# Patient Record
Sex: FEMALE | Race: WHITE | NOT HISPANIC OR LATINO | URBAN - METROPOLITAN AREA
[De-identification: names, ages, dates, MRNs, and addresses within clinical notes are randomized per-mention and may not be internally consistent; named-entity substitution may affect disease eponyms.]

---

## 2017-04-05 ENCOUNTER — EMERGENCY (EMERGENCY)
Facility: HOSPITAL | Age: 62
LOS: 1 days | Discharge: HOME | End: 2017-04-05
Attending: EMERGENCY MEDICINE | Admitting: EMERGENCY MEDICINE
Payer: COMMERCIAL

## 2017-04-05 VITALS
TEMPERATURE: 98 F | DIASTOLIC BLOOD PRESSURE: 82 MMHG | RESPIRATION RATE: 16 BRPM | OXYGEN SATURATION: 100 % | SYSTOLIC BLOOD PRESSURE: 150 MMHG | HEART RATE: 77 BPM

## 2017-04-05 VITALS
HEART RATE: 80 BPM | OXYGEN SATURATION: 100 % | DIASTOLIC BLOOD PRESSURE: 80 MMHG | RESPIRATION RATE: 16 BRPM | SYSTOLIC BLOOD PRESSURE: 145 MMHG | TEMPERATURE: 98 F

## 2017-04-05 DIAGNOSIS — E78.5 HYPERLIPIDEMIA, UNSPECIFIED: ICD-10-CM

## 2017-04-05 DIAGNOSIS — S01.01XA LACERATION WITHOUT FOREIGN BODY OF SCALP, INITIAL ENCOUNTER: ICD-10-CM

## 2017-04-05 DIAGNOSIS — S09.90XA UNSPECIFIED INJURY OF HEAD, INITIAL ENCOUNTER: ICD-10-CM
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
## 2017-04-05 DIAGNOSIS — Z98.890 OTHER SPECIFIED POSTPROCEDURAL STATES: ICD-10-CM

## 2017-04-05 DIAGNOSIS — Z90.49 ACQUIRED ABSENCE OF OTHER SPECIFIED PARTS OF DIGESTIVE TRACT: ICD-10-CM
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
## 2017-04-05 PROCEDURE — 70450 CT HEAD/BRAIN W/O DYE: CPT | Mod: 26

## 2017-04-05 PROCEDURE — 99284 EMERGENCY DEPT VISIT MOD MDM: CPT

## 2017-04-05 PROCEDURE — 70450 CT HEAD/BRAIN W/O DYE: CPT

## 2017-04-05 PROCEDURE — 99284 EMERGENCY DEPT VISIT MOD MDM: CPT | Mod: 25

## 2017-04-05 NOTE — ED PROVIDER NOTE - CARE PLAN
Principal Discharge DX:	Injury of head, initial encounter  Instructions for follow-up, activity and diet:	Rest, increase activity as tolerated.  Rest, eat lightly, Avoid alcohol or sedatives.  follow up with your physician in the next week. See attached ct head results.  Take motrin or tylenol for pain as needed.  Have your staples removed in one week.

## 2017-04-05 NOTE — ED PROVIDER NOTE - ATTENDING CONTRIBUTION TO CARE
Patient s/p mechanical fall with posterior head trauma.  No LOC, no vision changes, no nausea or vomiting.  Able to stand and ambulate after fall.  No blood thinners.  Seen at urgent care facility where laceration was stapled.  Sent to ED for CT head.  On exam VS WNL, GCS 15, neurologically intact.  Posterior scalp laceration with staples.  [place] head CT rule negative - discussed this with patient who is still requesting CT head.

## 2017-04-05 NOTE — ED PROVIDER NOTE - OBJECTIVE STATEMENT
60 yo female in room 5 presents to the ER for evaluation of head injury. Pt states "When I got out of the shower I slipped and fell and hit the edge of the steps and cut my head open and went to Urgent care and I got staples. The urgent care sent me here to the ER  for a head CT". Denies LOC. Pt reports dizziness immediately after incident that has resolved. 62 yo female in [employer] room 5 presents to the ER for evaluation of head injury. Pt states "When I got out of the shower I slipped and fell and hit the edge of the steps and cut my head open and went to Urgent care and I got staples. The urgent care sent me here to the ER  for a head CT". Denies LOC. Pt reports dizziness immediately after incident that has resolved.

## 2017-04-05 NOTE — ED ADULT NURSE NOTE - OBJECTIVE STATEMENT
60yo female pt AxOx3 ambulatory to ED sent by urgent care for CT. Pt tripped in shower and fell backwards, hitting head on floor, lacerating back of head. Denies dizziness/LOC. Neuro exam intact. PERRLA, 3mm brisk. Not on blood thinners. Pt has staples to lac done @ urgent care. NAD noted. VSS. NP @ bedside for eval.

## 2017-04-05 NOTE — ED PROVIDER NOTE - PLAN OF CARE
Rest, increase activity as tolerated.  Rest, eat lightly, Avoid alcohol or sedatives.  follow up with your physician in the next week. See attached ct head results.  Take motrin or tylenol for pain as needed.  Have your staples removed in one week.

## 2018-02-20 ENCOUNTER — APPOINTMENT (OUTPATIENT)
Dept: HEART AND VASCULAR | Facility: CLINIC | Age: 63
End: 2018-02-20

## 2018-04-25 ENCOUNTER — APPOINTMENT (OUTPATIENT)
Dept: BARIATRICS | Facility: CLINIC | Age: 63
End: 2018-04-25

## 2018-06-18 ENCOUNTER — APPOINTMENT (OUTPATIENT)
Dept: BARIATRICS | Facility: CLINIC | Age: 63
End: 2018-06-18
Payer: COMMERCIAL

## 2018-06-18 ENCOUNTER — OUTPATIENT (OUTPATIENT)
Dept: OUTPATIENT SERVICES | Facility: HOSPITAL | Age: 63
LOS: 1 days | End: 2018-06-18
Payer: COMMERCIAL

## 2018-06-18 VITALS
SYSTOLIC BLOOD PRESSURE: 136 MMHG | OXYGEN SATURATION: 98 % | HEIGHT: 63 IN | BODY MASS INDEX: 33.63 KG/M2 | HEART RATE: 75 BPM | DIASTOLIC BLOOD PRESSURE: 84 MMHG | WEIGHT: 189.81 LBS

## 2018-06-18 DIAGNOSIS — E66.9 OBESITY, UNSPECIFIED: ICD-10-CM

## 2018-06-18 DIAGNOSIS — Z98.84 BARIATRIC SURGERY STATUS: ICD-10-CM

## 2018-06-18 PROCEDURE — 74220 X-RAY XM ESOPHAGUS 1CNTRST: CPT

## 2018-06-18 PROCEDURE — 74220 X-RAY XM ESOPHAGUS 1CNTRST: CPT | Mod: 26

## 2018-06-18 PROCEDURE — 99214 OFFICE O/P EST MOD 30 MIN: CPT

## 2018-08-20 ENCOUNTER — APPOINTMENT (OUTPATIENT)
Dept: BARIATRICS | Facility: CLINIC | Age: 63
End: 2018-08-20
Payer: COMMERCIAL

## 2018-08-20 VITALS
WEIGHT: 186.07 LBS | DIASTOLIC BLOOD PRESSURE: 80 MMHG | HEART RATE: 78 BPM | HEIGHT: 63 IN | BODY MASS INDEX: 32.97 KG/M2 | SYSTOLIC BLOOD PRESSURE: 120 MMHG | OXYGEN SATURATION: 96 %

## 2018-08-20 DIAGNOSIS — R49.0 DYSPHONIA: ICD-10-CM

## 2018-08-20 PROCEDURE — S2083 ADJUSTMENT GASTRIC BAND: CPT

## 2018-08-20 PROCEDURE — 99214 OFFICE O/P EST MOD 30 MIN: CPT | Mod: 25

## 2018-10-01 ENCOUNTER — OUTPATIENT (OUTPATIENT)
Dept: OUTPATIENT SERVICES | Facility: HOSPITAL | Age: 63
LOS: 1 days | End: 2018-10-01
Payer: COMMERCIAL

## 2018-10-01 ENCOUNTER — APPOINTMENT (OUTPATIENT)
Dept: BARIATRICS | Facility: CLINIC | Age: 63
End: 2018-10-01
Payer: COMMERCIAL

## 2018-10-01 VITALS
SYSTOLIC BLOOD PRESSURE: 147 MMHG | HEIGHT: 63 IN | DIASTOLIC BLOOD PRESSURE: 89 MMHG | WEIGHT: 195 LBS | BODY MASS INDEX: 34.55 KG/M2 | OXYGEN SATURATION: 97 % | HEART RATE: 75 BPM

## 2018-10-01 DIAGNOSIS — K21.9 GASTRO-ESOPHAGEAL REFLUX DISEASE WITHOUT ESOPHAGITIS: ICD-10-CM

## 2018-10-01 DIAGNOSIS — Z98.84 BARIATRIC SURGERY STATUS: ICD-10-CM

## 2018-10-01 DIAGNOSIS — E66.9 OBESITY, UNSPECIFIED: ICD-10-CM

## 2018-10-01 PROCEDURE — 74220 X-RAY XM ESOPHAGUS 1CNTRST: CPT

## 2018-10-01 PROCEDURE — 74220 X-RAY XM ESOPHAGUS 1CNTRST: CPT | Mod: 26

## 2018-10-01 PROCEDURE — 99214 OFFICE O/P EST MOD 30 MIN: CPT

## 2018-10-01 RX ORDER — OMEPRAZOLE 40 MG/1
40 CAPSULE, DELAYED RELEASE ORAL
Refills: 0 | Status: COMPLETED | COMMUNITY
End: 2018-10-01

## 2018-10-02 ENCOUNTER — CLINICAL ADVICE (OUTPATIENT)
Age: 63
End: 2018-10-02

## 2018-10-09 ENCOUNTER — APPOINTMENT (OUTPATIENT)
Dept: BARIATRICS | Facility: CLINIC | Age: 63
End: 2018-10-09
Payer: COMMERCIAL

## 2018-10-09 VITALS
BODY MASS INDEX: 34.55 KG/M2 | WEIGHT: 195 LBS | DIASTOLIC BLOOD PRESSURE: 86 MMHG | OXYGEN SATURATION: 97 % | HEIGHT: 63 IN | HEART RATE: 81 BPM | SYSTOLIC BLOOD PRESSURE: 136 MMHG

## 2018-10-09 PROCEDURE — 99214 OFFICE O/P EST MOD 30 MIN: CPT | Mod: 25

## 2018-10-09 PROCEDURE — S2083 ADJUSTMENT GASTRIC BAND: CPT

## 2018-10-17 ENCOUNTER — APPOINTMENT (OUTPATIENT)
Dept: BARIATRICS/WEIGHT MGMT | Facility: CLINIC | Age: 63
End: 2018-10-17
Payer: COMMERCIAL

## 2018-10-17 VITALS — BODY MASS INDEX: 34.96 KG/M2 | HEIGHT: 63 IN | WEIGHT: 197.31 LBS

## 2018-10-17 PROCEDURE — 97802 MEDICAL NUTRITION INDIV IN: CPT

## 2018-11-05 ENCOUNTER — CLINICAL ADVICE (OUTPATIENT)
Age: 63
End: 2018-11-05

## 2018-11-06 ENCOUNTER — CLINICAL ADVICE (OUTPATIENT)
Age: 63
End: 2018-11-06

## 2018-11-19 ENCOUNTER — APPOINTMENT (OUTPATIENT)
Dept: BARIATRICS | Facility: CLINIC | Age: 63
End: 2018-11-19

## 2018-11-19 ENCOUNTER — APPOINTMENT (OUTPATIENT)
Dept: BARIATRICS/WEIGHT MGMT | Facility: CLINIC | Age: 63
End: 2018-11-19

## 2018-12-05 ENCOUNTER — APPOINTMENT (OUTPATIENT)
Dept: BARIATRICS | Facility: CLINIC | Age: 63
End: 2018-12-05
Payer: COMMERCIAL

## 2018-12-05 ENCOUNTER — OUTPATIENT (OUTPATIENT)
Dept: OUTPATIENT SERVICES | Facility: HOSPITAL | Age: 63
LOS: 1 days | End: 2018-12-05
Payer: COMMERCIAL

## 2018-12-05 VITALS
WEIGHT: 201.06 LBS | BODY MASS INDEX: 35.62 KG/M2 | DIASTOLIC BLOOD PRESSURE: 80 MMHG | HEIGHT: 63 IN | SYSTOLIC BLOOD PRESSURE: 130 MMHG

## 2018-12-05 DIAGNOSIS — R63.5 ABNORMAL WEIGHT GAIN: ICD-10-CM

## 2018-12-05 DIAGNOSIS — E66.9 OBESITY, UNSPECIFIED: ICD-10-CM

## 2018-12-05 DIAGNOSIS — Z98.84 BARIATRIC SURGERY STATUS: ICD-10-CM

## 2018-12-05 PROCEDURE — S2083 ADJUSTMENT GASTRIC BAND: CPT

## 2018-12-05 PROCEDURE — 74220 X-RAY XM ESOPHAGUS 1CNTRST: CPT | Mod: 26

## 2018-12-05 PROCEDURE — 74220 X-RAY XM ESOPHAGUS 1CNTRST: CPT

## 2018-12-05 PROCEDURE — 99214 OFFICE O/P EST MOD 30 MIN: CPT | Mod: 25

## 2019-03-05 ENCOUNTER — APPOINTMENT (OUTPATIENT)
Dept: BARIATRICS | Facility: CLINIC | Age: 64
End: 2019-03-05

## 2020-01-07 ENCOUNTER — APPOINTMENT (OUTPATIENT)
Dept: GASTROENTEROLOGY | Facility: CLINIC | Age: 65
End: 2020-01-07
Payer: COMMERCIAL

## 2020-01-07 VITALS
SYSTOLIC BLOOD PRESSURE: 133 MMHG | HEART RATE: 78 BPM | DIASTOLIC BLOOD PRESSURE: 87 MMHG | WEIGHT: 183 LBS | BODY MASS INDEX: 31.24 KG/M2 | HEIGHT: 64 IN

## 2020-01-07 DIAGNOSIS — R10.13 EPIGASTRIC PAIN: ICD-10-CM

## 2020-01-07 DIAGNOSIS — K21.9 GASTRO-ESOPHAGEAL REFLUX DISEASE W/OUT ESOPHAGITIS: ICD-10-CM

## 2020-01-07 PROCEDURE — 99204 OFFICE O/P NEW MOD 45 MIN: CPT

## 2020-01-07 RX ORDER — LIRAGLUTIDE 6 MG/ML
18 INJECTION, SOLUTION SUBCUTANEOUS
Refills: 0 | Status: ACTIVE | COMMUNITY

## 2020-01-07 NOTE — ASSESSMENT
[FreeTextEntry1] : 1.  Epigastric pain after lap-band and sleeve gastrectomy.  US in January and December 2019 with gallstones, normal bile ducts.  Likely GERD with esophagitis; differential includes biliary colic or pancreatitis/gastritis due to Saxenda.\par 2.  Obesity status post lap-band (removed) and sleeve gastrectomy (April 2019).\par \par Recs:\par - Ultrasound results reviewed.\par - Obtain recent labs and prior colonoscopy results for review.\par - Continue small, frequent meals after gastrectomy.  Low fat diet.\par - GERD lifestyle modifications.\par - Try antacid or Pepcid with next attack.  If pain improved, less likely to be due to gallstones.\par - Follow up with surgeon.

## 2020-01-07 NOTE — REVIEW OF SYSTEMS
[Abdominal Pain] : abdominal pain [Feeling Poorly] : feeling poorly [Constipation] : constipation [Heartburn] : heartburn [Joint Stiffness] : joint stiffness [Dizziness] : dizziness [Negative] : Heme/Lymph [As Noted in HPI] : as noted in HPI

## 2020-01-07 NOTE — PHYSICAL EXAM
[General Appearance - In No Acute Distress] : in no acute distress [General Appearance - Alert] : alert [Sclera] : the sclera and conjunctiva were normal [Extraocular Movements] : extraocular movements were intact [PERRL With Normal Accommodation] : pupils were equal in size, round, and reactive to light [Hearing Threshold Finger Rub Not [place]] : hearing was normal [Outer Ear] : the ears and nose were normal in appearance [Oropharynx] : the oropharynx was normal [Neck Cervical Mass (___cm)] : no neck mass was observed [Neck Appearance] : the appearance of the neck was normal [Auscultation Breath Sounds / Voice Sounds] : lungs were clear to auscultation bilaterally [Heart Rate And Rhythm] : heart rate was normal and rhythm regular [Heart Sounds] : normal S1 and S2 [Edema] : there was no peripheral edema [Bowel Sounds] : normal bowel sounds [Abdomen Soft] : soft [] : no hepato-splenomegaly [Abdomen Hernia] : no hernia was discovered [Abdomen Mass (___ Cm)] : no abdominal mass palpated [Cervical Lymph Nodes Enlarged Anterior Bilaterally] : anterior cervical [Supraclavicular Lymph Nodes Enlarged Bilaterally] : supraclavicular [No CVA Tenderness] : no ~M costovertebral angle tenderness [Abnormal Walk] : normal gait [No Spinal Tenderness] : no spinal tenderness [Musculoskeletal - Swelling] : no joint swelling seen [Skin Color & Pigmentation] : normal skin color and pigmentation [Skin Turgor] : normal skin turgor [No Focal Deficits] : no focal deficits [Oriented To Time, Place, And Person] : oriented to person, place, and time [FreeTextEntry1] : mild epigastric and LUQ tenderness, surgical scars

## 2020-01-07 NOTE — HISTORY OF PRESENT ILLNESS
[Heartburn] : denies heartburn [Nausea] : stable nausea [Vomiting] : stable vomiting [Diarrhea] : denies diarrhea [Constipation] : denies constipation [Yellow Skin Or Eyes (Jaundice)] : denies jaundice [Abdominal Pain] : stable abdominal pain [Rectal Pain] : denies rectal pain [Abdominal Swelling] : abdominal swelling stable [de-identified] : Sulema presents to the office today for evaluation of epigastric pain.  Her friend referred her.\par \par She reports that she has had episodic severe epigastric pain which initially started after having a lap-band procedure for obesity about 6 years ago.  She cannot recall if she lost any weight with the band, but due to several issues, the band was removed more than a year ago.  Due to what she thought were gas pains, she underwent an ultrasound in January 2019 which showed gallstones and a fatty liver.  She underwent a sleeve gastrectomy at [facility] with Dr. Colin in April 2019.  After the sleeve, she has lost about 30 pounds.  She started having more pain around her epigastric region radiating around the flanks and up her chest.  The pain would be so severe that she could not breath and thought she was having a heart attack.  The pain would occur more if she overate, ate too quickly or even drank a large amount of water.   If she forced herself to vomit, she would feel better.  If she grazes on food throughout the day, she tends to not have pain.  After her surgery, she was advised to use an antacid but has not since she does not like taking medications.  She denies any fever, jaundice, change in bowel habits or family history of colon cancer.  Her nephew may have had cholangiocarcinoma.  She had multiple EGDs in the past before her bariatric procedures and she has had multiple colonoscopies in the past without polyps.

## 2020-05-29 ENCOUNTER — APPOINTMENT (OUTPATIENT)
Dept: ORTHOPEDIC SURGERY | Facility: CLINIC | Age: 65
End: 2020-05-29
Payer: COMMERCIAL

## 2020-05-29 VITALS
DIASTOLIC BLOOD PRESSURE: 87 MMHG | SYSTOLIC BLOOD PRESSURE: 153 MMHG | HEART RATE: 89 BPM | WEIGHT: 178 LBS | HEIGHT: 65 IN | BODY MASS INDEX: 29.66 KG/M2

## 2020-05-29 DIAGNOSIS — M17.12 UNILATERAL PRIMARY OSTEOARTHRITIS, LEFT KNEE: ICD-10-CM

## 2020-05-29 PROCEDURE — 73564 X-RAY EXAM KNEE 4 OR MORE: CPT | Mod: LT

## 2020-05-29 PROCEDURE — 99203 OFFICE O/P NEW LOW 30 MIN: CPT

## 2020-05-29 NOTE — HISTORY OF PRESENT ILLNESS
[de-identified] : 64 year old female presents today with left knee pain. Patient experienced a mechanical fall 1 week ago in her home during a vertigo episode. Since the fall, patient states she has experienced lateral knee pain. Denies swelling, clicking, and locking. Patient is not taking any medications for the pain. Denies numbness, tingling. Pt states she has received cortisone injections in the past in her left knee with noted improvement.\par \par The patient's past medical history, past surgical history, medications, allergies, and social history were reviewed by me today with the patient and documented accordingly. In addition, the patient's family history, which is noncontributory to this visit, was also reviewed.\par

## 2020-05-29 NOTE — PHYSICAL EXAM
[de-identified] : General Exam\par \par Well developed, well nourished\par No apparent distress\par Oriented to person, place, and time\par Mood: Normal\par Affect: Normal\par Balance and coordination: Normal\par Gait: Normal\par \par left knee exam\par \par Skin: Clean, dry, intact\par Inspection: No obvious malalignment, no masses, no swelling, no effusion.\par Tenderness: no MJLT. No LJLT. No tenderness over the medial and lateral patella facets. No ttp medial/lateral epicondyle, patella tendon, tibial tubercle, pes anserinus, or proximal fibula. +ttp pop fossa. \par ROM: 0 to 130° no pain with deep flexion in both knees\par Stability: Stable to varus, valgus, lachman testing. Negative anterior/posterior drawer. pain w internal rotation of the knee\par Additional tests: Negative McMurrays test, Negative patellar grind test. \par Strength: 5/5 Q/H/TA/GS/EHL, no atrophy\par Neuro: In tact to light touch throughout in dp/sp/tib/myke/saph nerve districutions, DTR's normal\par Pulses: 2+ DP/PT pulses.\par  [de-identified] : \par The following radiographs were ordered and read by me during this patients visit. I reviewed each radiograph in detail with the patient and discussed the findings as highlighted below. \par 4 views of the left knee were obtained today. Her mild degenerative changes minimal joint space narrowing. There is no fracture\par \par

## 2020-05-29 NOTE — DISCUSSION/SUMMARY
[de-identified] : 64-year-old female mild left knee pain posteriorly she has pain with internal rotation tenderness along the popliteus muscle her joint lines denied any tenderness she has no mechanical symptoms recommended a stretching rest home exercises. Diclofenac topical for pain ice. Followup as needed. All questions answered

## 2020-12-01 ENCOUNTER — EMERGENCY (EMERGENCY)
Facility: HOSPITAL | Age: 65
LOS: 1 days | Discharge: HOME | End: 2020-12-01
Attending: EMERGENCY MEDICINE
Payer: COMMERCIAL

## 2020-12-01 VITALS
OXYGEN SATURATION: 98 % | HEART RATE: 67 BPM | TEMPERATURE: 98 F | DIASTOLIC BLOOD PRESSURE: 60 MMHG | RESPIRATION RATE: 20 BRPM | SYSTOLIC BLOOD PRESSURE: 154 MMHG

## 2020-12-01 VITALS
RESPIRATION RATE: 16 BRPM | SYSTOLIC BLOOD PRESSURE: 158 MMHG | HEIGHT: 64 IN | HEART RATE: 84 BPM | OXYGEN SATURATION: 98 % | WEIGHT: 182.98 LBS | DIASTOLIC BLOOD PRESSURE: 82 MMHG | TEMPERATURE: 98 F

## 2020-12-01 LAB
ALBUMIN SERPL ELPH-MCNC: 4.2 G/DL — SIGNIFICANT CHANGE UP (ref 3.3–5)
ALBUMIN SERPL ELPH-MCNC: 4.5 G/DL — SIGNIFICANT CHANGE UP (ref 3.3–5)
ALP SERPL-CCNC: 101 U/L — SIGNIFICANT CHANGE UP (ref 40–120)
ALP SERPL-CCNC: 107 U/L — SIGNIFICANT CHANGE UP (ref 40–120)
ALT FLD-CCNC: 119 U/L — HIGH (ref 10–45)
ALT FLD-CCNC: 129 U/L — HIGH (ref 10–45)
ANION GAP SERPL CALC-SCNC: 11 MMOL/L — SIGNIFICANT CHANGE UP (ref 5–17)
ANION GAP SERPL CALC-SCNC: 13 MMOL/L — SIGNIFICANT CHANGE UP (ref 5–17)
AST SERPL-CCNC: 155 U/L — HIGH (ref 10–40)
AST SERPL-CCNC: 178 U/L — HIGH (ref 10–40)
BILIRUB SERPL-MCNC: 0.4 MG/DL — SIGNIFICANT CHANGE UP (ref 0.2–1.2)
BILIRUB SERPL-MCNC: 0.6 MG/DL — SIGNIFICANT CHANGE UP (ref 0.2–1.2)
BUN SERPL-MCNC: 13 MG/DL — SIGNIFICANT CHANGE UP (ref 7–23)
BUN SERPL-MCNC: 16 MG/DL — SIGNIFICANT CHANGE UP (ref 7–23)
CALCIUM SERPL-MCNC: 8.9 MG/DL — SIGNIFICANT CHANGE UP (ref 8.4–10.5)
CALCIUM SERPL-MCNC: 9.4 MG/DL — SIGNIFICANT CHANGE UP (ref 8.4–10.5)
CHLORIDE SERPL-SCNC: 103 MMOL/L — SIGNIFICANT CHANGE UP (ref 96–108)
CHLORIDE SERPL-SCNC: 103 MMOL/L — SIGNIFICANT CHANGE UP (ref 96–108)
CO2 SERPL-SCNC: 24 MMOL/L — SIGNIFICANT CHANGE UP (ref 22–31)
CO2 SERPL-SCNC: 24 MMOL/L — SIGNIFICANT CHANGE UP (ref 22–31)
CREAT SERPL-MCNC: 0.7 MG/DL — SIGNIFICANT CHANGE UP (ref 0.5–1.3)
CREAT SERPL-MCNC: 0.73 MG/DL — SIGNIFICANT CHANGE UP (ref 0.5–1.3)
GAS PNL BLDV: SIGNIFICANT CHANGE UP
GLUCOSE SERPL-MCNC: 102 MG/DL — HIGH (ref 70–99)
GLUCOSE SERPL-MCNC: 96 MG/DL — SIGNIFICANT CHANGE UP (ref 70–99)
LIDOCAIN IGE QN: 38 U/L — SIGNIFICANT CHANGE UP (ref 7–60)
POTASSIUM SERPL-MCNC: 3.9 MMOL/L — SIGNIFICANT CHANGE UP (ref 3.5–5.3)
POTASSIUM SERPL-MCNC: 4 MMOL/L — SIGNIFICANT CHANGE UP (ref 3.5–5.3)
POTASSIUM SERPL-SCNC: 3.9 MMOL/L — SIGNIFICANT CHANGE UP (ref 3.5–5.3)
POTASSIUM SERPL-SCNC: 4 MMOL/L — SIGNIFICANT CHANGE UP (ref 3.5–5.3)
PROT SERPL-MCNC: 6.9 G/DL — SIGNIFICANT CHANGE UP (ref 6–8.3)
PROT SERPL-MCNC: 7.2 G/DL — SIGNIFICANT CHANGE UP (ref 6–8.3)
SODIUM SERPL-SCNC: 138 MMOL/L — SIGNIFICANT CHANGE UP (ref 135–145)
SODIUM SERPL-SCNC: 140 MMOL/L — SIGNIFICANT CHANGE UP (ref 135–145)
TROPONIN T, HIGH SENSITIVITY RESULT: <6 NG/L — SIGNIFICANT CHANGE UP (ref 0–51)

## 2020-12-01 PROCEDURE — 83605 ASSAY OF LACTIC ACID: CPT

## 2020-12-01 PROCEDURE — 74177 CT ABD & PELVIS W/CONTRAST: CPT

## 2020-12-01 PROCEDURE — 85018 HEMOGLOBIN: CPT

## 2020-12-01 PROCEDURE — 82803 BLOOD GASES ANY COMBINATION: CPT

## 2020-12-01 PROCEDURE — 99284 EMERGENCY DEPT VISIT MOD MDM: CPT | Mod: 25

## 2020-12-01 PROCEDURE — 82435 ASSAY OF BLOOD CHLORIDE: CPT

## 2020-12-01 PROCEDURE — 76705 ECHO EXAM OF ABDOMEN: CPT | Mod: 26

## 2020-12-01 PROCEDURE — 99285 EMERGENCY DEPT VISIT HI MDM: CPT

## 2020-12-01 PROCEDURE — 93010 ELECTROCARDIOGRAM REPORT: CPT

## 2020-12-01 PROCEDURE — 96374 THER/PROPH/DIAG INJ IV PUSH: CPT | Mod: XU

## 2020-12-01 PROCEDURE — 84295 ASSAY OF SERUM SODIUM: CPT

## 2020-12-01 PROCEDURE — 82947 ASSAY GLUCOSE BLOOD QUANT: CPT

## 2020-12-01 PROCEDURE — 83690 ASSAY OF LIPASE: CPT

## 2020-12-01 PROCEDURE — 82330 ASSAY OF CALCIUM: CPT

## 2020-12-01 PROCEDURE — 84132 ASSAY OF SERUM POTASSIUM: CPT

## 2020-12-01 PROCEDURE — 84484 ASSAY OF TROPONIN QUANT: CPT

## 2020-12-01 PROCEDURE — 76705 ECHO EXAM OF ABDOMEN: CPT

## 2020-12-01 PROCEDURE — 85025 COMPLETE CBC W/AUTO DIFF WBC: CPT

## 2020-12-01 PROCEDURE — 85014 HEMATOCRIT: CPT

## 2020-12-01 PROCEDURE — 83735 ASSAY OF MAGNESIUM: CPT

## 2020-12-01 PROCEDURE — 80053 COMPREHEN METABOLIC PANEL: CPT

## 2020-12-01 PROCEDURE — 74177 CT ABD & PELVIS W/CONTRAST: CPT | Mod: 26

## 2020-12-01 PROCEDURE — 93005 ELECTROCARDIOGRAM TRACING: CPT | Mod: 76

## 2020-12-01 RX ORDER — SODIUM CHLORIDE 9 MG/ML
1000 INJECTION, SOLUTION INTRAVENOUS ONCE
Refills: 0 | Status: COMPLETED | OUTPATIENT
Start: 2020-12-01 | End: 2020-12-01

## 2020-12-01 RX ORDER — SUCRALFATE 1 G
1 TABLET ORAL ONCE
Refills: 0 | Status: DISCONTINUED | OUTPATIENT
Start: 2020-12-01 | End: 2020-12-01

## 2020-12-01 RX ORDER — FAMOTIDINE 10 MG/ML
20 INJECTION INTRAVENOUS ONCE
Refills: 0 | Status: COMPLETED | OUTPATIENT
Start: 2020-12-01 | End: 2020-12-01

## 2020-12-01 RX ADMIN — Medication 30 MILLILITER(S): at 09:34

## 2020-12-01 RX ADMIN — SODIUM CHLORIDE 1000 MILLILITER(S): 9 INJECTION, SOLUTION INTRAVENOUS at 09:34

## 2020-12-01 RX ADMIN — FAMOTIDINE 20 MILLIGRAM(S): 10 INJECTION INTRAVENOUS at 09:34

## 2020-12-01 NOTE — ED PROVIDER NOTE - PROVIDER TOKENS
PROVIDER:[TOKEN:[[id number]]],PROVIDER:[TOKEN:[[id number]]],PROVIDER:[TOKEN:[[id number]]]

## 2020-12-01 NOTE — ED PROVIDER NOTE - CLINICAL SUMMARY MEDICAL DECISION MAKING FREE TEXT BOX
Attending Angela Hand: 66 yo female presenting with abdominal pain for last few months that has been getting progressively worse. on exam no pulsatile mass and strong dp making aaa less likely. pt with h/o gastric sleeve. no RUQ pain and pain not related to food making acute cholecystitis less likely. denies any sob or difficulty breathing, however with age and risk factors willobtain ekg to further evaluate. will ct abd/pel and re-eval

## 2020-12-01 NOTE — ED PROVIDER NOTE - PHYSICAL EXAMINATION
Attending Angela Hand: Gen: NAD, heent: atrauamtic, eomi, perrla, mmm, op pink, uvula midline, neck; nttp, no nuchal rigidity, chest: nttp, no crepitus, cv: rrr, no murmurs, lungs: ctab, abd: soft, nontender, no pulsatile mass, nondistended, no peritoneal signs, +BS, no guarding, ext: wwp, neg homans, skin: no rash, neuro: awake and alert, following commands, speech clear, sensation and strength intact, no focal deficits

## 2020-12-01 NOTE — ED ADULT NURSE NOTE - OBJECTIVE STATEMENT
64 y/o F, PMH HTN, s/p gastric sleeve 1.5 years ago, presenting with epigastric pain x 1 year but worsened x past few months, feels squeezing and like gas, pt reports she takes 6-12 gas X/day plus apple cider vinegar with no relief, only thing that helps is when she makes herself throw up. She never has associated nausea but making herself throw up improves the pain. Pt report she had an MRI of the abdomen in the past showing large amounts of gas so they could not see gall bladder. Pt denies headache, dizziness, chest pain, palpitations, cough, SOB, n/v/d, bloody stool, urinary symptoms, back pain, fevers, chills, weakness at this time. Safety maintained.

## 2020-12-01 NOTE — ED PROVIDER NOTE - PROGRESS NOTE DETAILS
Attending Angela Hand: pt found to have a transamninits. does have a h/o gallstones. no ruq ttp. ct scan performed. will d/w surgery Attending Angela Hand: pt seen by surgery and cleared for dc/ recommend GI follow up. pt made aware of cholelithiasis. no ruq pain on exam.

## 2020-12-01 NOTE — CONSULT NOTE ADULT - ATTENDING COMMENTS
Patient seen/examined.  Agree w above note and plan and have discussed plan w house staff.  Now improved, abdomen soft.  Home. To f/u w GI and Dr Stephane Aguilar MD

## 2020-12-01 NOTE — ED PROVIDER NOTE - CARE PROVIDER_API CALL
Ezequiel Aguilar  SURGERY  [address] [facility], [address]  [place] [address]  Phone: [phone]  Fax: [phone]  Follow Up Time:     Mike Haque)  Internal Medicine  [address] [place] [address]  Phone: [phone]  Fax: [phone]  Follow Up Time:     Yan Santoyo (DO)  Gastroenterology; Internal Medicine  [address] [place] [address]  Phone: [phone]  Fax: [phone]  Follow Up Time:

## 2020-12-01 NOTE — ED PROVIDER NOTE - PATIENT PORTAL LINK FT
You can access the FollowMyHealth Patient Portal offered by  by registering at the following website: [email or web address]. By joining [facility]’s FollowMyHealth portal, you will also be able to view your health information using other applications (apps) compatible with our system.

## 2020-12-01 NOTE — ED PROVIDER NOTE - CARE PROVIDERS DIRECT ADDRESSES
[email or web address]

## 2020-12-01 NOTE — ED PROVIDER NOTE - NSFOLLOWUPINSTRUCTIONS_ED_ALL_ED_FT
Abdominal Pain    Many things can cause abdominal pain. Many times, abdominal pain is not caused by a disease and will improve without treatment. Your health care provider will do a physical exam to determine if there is a dangerous cause of your pain; blood tests and imaging may help determine the cause of your pain. However, in many cases, no cause may be found and you may need further testing as an outpatient. Monitor your abdominal pain for any changes.     SEEK IMMEDIATE MEDICAL CARE IF YOU HAVE ANY OF THE FOLLOWING SYMPTOMS: worsening abdominal pain, uncontrollable vomiting, profuse diarrhea, inability to have bowel movements or pass gas, black or bloody stools, fever accompanying chest pain or back pain, or fainting. These symptoms may represent a serious problem that is an emergency. Do not wait to see if the symptoms will go away. Get medical help right away. Call 911 and do not drive yourself to the hospital    You were found to have stones within your gallbladder. please return if you have worsening pain in the right upper part of your abdomen or any pain in your back    Please avoid taking motrin or nsaids. Please follow up with GI doctors

## 2020-12-01 NOTE — ED PROVIDER NOTE - OBJECTIVE STATEMENT
Attending Angela Hand: 66 y/o female h/o HTN, s/p gastric sleeve 1.5 years ago scheduled for shoulder surgery presenting with diffuse abdominal pain. pain started a few months ago. has had MRI of abdomen in the past showing large amounts of gas. pt states has been having intermittent pain around the uumbilical area that have been getting worse. vomited x1 yesterday. no fevers or chills. has been taking gasx, apple vinegar without any relief. no back pain. no dysuria. has had a previous colonoscopy. no blood in the stool or vomit. no weight loss. having normal bm Attending Angela Hand: 66 y/o female h/o HTN, s/p gastric sleeve 1.5 years ago scheduled for shoulder surgery presenting with diffuse abdominal pain. pain started a few months ago. has had MRI of abdomen in the past showing large amounts of gas. pt states has been having intermittent pain around the uumbilical area that have been getting worse. vomited x1 yesterday. no fevers or chills. has been taking gasx, apple vinegar without any relief. no back pain. no dysuria. has had a previous colonoscopy. no blood in the stool or vomit. no weight loss. having normal bm.    Argelia Chavez PGY2: state reece has been present for approximately 1 year, sharp in nature epgastric region radiating around into her back. denies nausea but states that if she makes herself vomit her pain improves. taking gasx and apple cider vinegar without relief. worse laying flat. went for US of gallbladder. Attending Angela Hand: 66 y/o female h/o HTN, s/p gastric sleeve 1.5 years ago scheduled for shoulder surgery presenting with diffuse abdominal pain. pain started a few months ago. has had MRI of abdomen in the past showing large amounts of gas. pt states has been having intermittent pain around the uumbilical area that have been getting worse. vomited x1 yesterday. no fevers or chills. has been taking gasx, apple vinegar without any relief. no back pain. no dysuria. has had a previous colonoscopy. no blood in the stool or vomit. no weight loss. having normal bm.    Argelia Chavez PGY2: state reece has been present for approximately 1 year, sharp in nature epgastric region radiating around into her back. denies nausea but states that if she makes herself vomit her pain improves. taking gasx and apple cider vinegar without relief. worse laying flat. went for US of gallbladder but notes that it was inconclusive because she had a lot of gas present. normal BM with no blood noted. EGD prior to her gastric sleeve 1.5 years ago.

## 2020-12-01 NOTE — ED PROVIDER NOTE - CARE PLAN
Principal Discharge DX:	Epigastric pain   Principal Discharge DX:	Epigastric pain  Secondary Diagnosis:	Cholelithiasis

## 2020-12-01 NOTE — ED ADULT NURSE NOTE - NSIMPLEMENTINTERV_GEN_ALL_ED
Implemented All Universal Safety Interventions:  [place] to call system. Call bell, personal items and telephone within reach. Instruct patient to call for assistance. Room bathroom lighting operational. Non-slip footwear when patient is off stretcher. Physically safe environment: no spills, clutter or unnecessary equipment. Stretcher in lowest position, wheels locked, appropriate side rails in place.

## 2020-12-01 NOTE — ED ADULT TRIAGE NOTE - CHIEF COMPLAINT QUOTE
epigastric pain radiating to the left chest for months, but worsening since last night and this morning.

## 2020-12-01 NOTE — ED PROVIDER NOTE - ATTENDING CONTRIBUTION TO CARE
Attending MD Angela Hand:  I personally have seen and examined this patient.  Resident note reviewed and agree on plan of care and except where noted.  See HPI, PE, and MDM for details.

## 2020-12-01 NOTE — CONSULT NOTE ADULT - SUBJECTIVE AND OBJECTIVE BOX
BARIATRIC SURGERY CONSULT NOTE  NEVILLE DOYLE  |  [id number]  |  12-01-20 @ 13:13    CC: Patient is a 65y old  Female who presents with a chief complaint of abdominal pain    HPI: 65F PMH HTN, HLD, GERD, h/o gastric band s/p removal, s/p gastric sleeve ~1.5 yrs ago at [facility] presenting with abdominal pain. Patient states pain has been going on for quite some time (>months) and has similar episode to this ~once/week. Pain onset is usually after food, improves with gas-x and apple cider vinegar. Occasional has to make herself vomit. Current episode prompting visit to the ED today started last evening after dinner. Since pain onset has been able to have coffee and chocolate this AM, but pain persistent and thus patient presented for further evaluation. Pain is located in the epigastrium primarily but radiates bilateral sides and up center of chest. Currently, pain has improved. Patient no longer nauseous. Patient has seen GI dr in January who recommended EGD, however patient did not follow up secondary to the pandemic. States she has been on omeprazole but stopped taking it becomes she was feeling better. She reports overall poor compliance with PO medications. She has not seen her bariatric surgeon since Dec 2018. States she lost ~50 labs but no weight loss recently. Denies, fevers, chills, cough, sick contacts, diarrhea, constipation.      INTERVAL EVENTS: In ED, hemodynamically stable. CTAP PO/IV con without acute pathology. Bariatric surgery consulted.     REVIEW OF SYSTEMS:  General: denies weight change, fever or fatigue  HEENT: denies sore throat, hoarseness  Respiratory: denies cough, shortness of breath at rest and on exertion, wheezing  Cardiovascular: denies chest pain, abnormal heart rhythm, PND, palpitations  Gastrointestinal: denies nausea, vomiting, diarrhea, bloody or black bowel movements  Genitourinary: denies frequent urination, painful urination, kidney disease  Neurological: denies seizures, headaches  Muscoloskeletal: denies any joint pains  Psychiatric: denies depression, anxiety    PAST MEDICAL & SURGICAL HISTORY:  Morbid Obesity  Obstructive Sleep Apnea  Hyperlipidemia  S/P D&C  S/P Appendectomy    MEDICATIONS  (STANDING):   noncompliant with omeprazole   Gas-x     Allergies: No Known Allergies    SOCIAL HISTORY: denies ETOH, nonsmoker    FAMILY HISTORY: noncontributory    Objective:   Vital Signs Last 24 Hrs  T(C): 36.7 (01 Dec 2020 08:40), Max: 36.8 (01 Dec 2020 07:03)  T(F): 98 (01 Dec 2020 08:40), Max: 98.3 (01 Dec 2020 07:03)  HR: 77 (01 Dec 2020 08:40) (77 - 84)  BP: 161/95 (01 Dec 2020 08:40) (158/82 - 161/95)  BP(mean): --  RR: 16 (01 Dec 2020 08:40) (16 - 16)  SpO2: 98% (01 Dec 2020 08:40) (98% - 98%)  CAPILLARY BLOOD GLUCOSE          Physical Exam:  General: NAD, resting comfortably   CV: regular rate and rhythm   Pulm: no increased work of breathing   Abdomen: soft, nontender, nondistended, no rebound or guarding    Extremities: warm and well perfused      LABS:                        14.2   8.40  )-----------( 240      ( 01 Dec 2020 09:27 )             42.9     12-01    138  |  103  |  16  ----------------------------<  96  4.0   |  24  |  0.70    Ca    8.9      01 Dec 2020 09:27  Mg     2.0     12-01    TPro  7.2  /  Alb  4.2  /  TBili  0.6  /  DBili  x   /  AST  178<H>  /  ALT  119<H>  /  AlkPhos  101  12-01      LIVER FUNCTIONS - ( 01 Dec 2020 09:27 )  Alb: 4.2 g/dL / Pro: 7.2 g/dL / ALK PHOS: 101 U/L / ALT: 119 U/L / AST: 178 U/L / GGT: x                     RADIOLOGY & ADDITIONAL STUDIES:  PACS Image: Image(s) Available (12-01-20 @ 12:01)     BARIATRIC SURGERY CONSULT NOTE  NEVILLE DOYLE  |  [id number]  |  12-01-20 @ 13:13    CC: Patient is a 65y old  Female who presents with a chief complaint of abdominal pain    HPI: 65F PMH HTN, HLD, GERD, h/o gastric band s/p removal, s/p gastric sleeve ~1.5 yrs ago at [facility] presenting with abdominal pain. Patient states pain has been going on for quite some time (>months) and has similar episodes to this ~once/week for past months. Pain onset is usually after food (worse after larger meals), improves with gas-x and apple cider vinegar. Occasional has to make herself vomit. Current episode that started last evening was severe pain, prompting visit to the ED today. Since pain onset has been able to have coffee and chocolate this AM, but pain persistent and thus patient presented for further evaluation. Pain is located in the epigastrium primarily but radiates bilateral sides and upwards. Currently, pain has improved. Patient no longer nauseous. Patient has seen GI dr in January who recommended EGD, however patient did not follow up secondary to the pandemic. States she has been on omeprazole for acid refulz but stopped taking it becomes she was feeling better. She reports overall poor compliance with PO medications. She has not seen her bariatric surgeon since ~ Dec 2018. States she lost ~50 lbs but no weight loss recently. Denies, fevers, chills, cough, sick contacts, diarrhea, constipation.      INTERVAL EVENTS: In ED, hemodynamically stable. CTAP PO/IV con without acute pathology. Bariatric surgery consulted.     REVIEW OF SYSTEMS:  General: denies weight change, fever or fatigue  HEENT: denies sore throat, hoarseness  Respiratory: denies cough, shortness of breath at rest and on exertion, wheezing  Cardiovascular: denies chest pain, abnormal heart rhythm, PND, palpitations  Gastrointestinal: denies nausea, vomiting, diarrhea, bloody or black bowel movements  Genitourinary: denies frequent urination, painful urination, kidney disease  Neurological: denies seizures, headaches  Muscoloskeletal: denies any joint pains  Psychiatric: denies depression, anxiety    PAST MEDICAL & SURGICAL HISTORY:  Morbid Obesity  Obstructive Sleep Apnea  Hyperlipidemia  S/P D&C  S/P Appendectomy    MEDICATIONS  (STANDING):   noncompliant with omeprazole   Gas-x     Allergies: No Known Allergies    SOCIAL HISTORY: denies ETOH, nonsmoker    FAMILY HISTORY: noncontributory    Objective:   Vital Signs Last 24 Hrs  T(C): 36.7 (01 Dec 2020 08:40), Max: 36.8 (01 Dec 2020 07:03)  T(F): 98 (01 Dec 2020 08:40), Max: 98.3 (01 Dec 2020 07:03)  HR: 77 (01 Dec 2020 08:40) (77 - 84)  BP: 161/95 (01 Dec 2020 08:40) (158/82 - 161/95)  BP(mean): --  RR: 16 (01 Dec 2020 08:40) (16 - 16)  SpO2: 98% (01 Dec 2020 08:40) (98% - 98%)    Physical Exam:  General: NAD, resting comfortably   CV: regular rate and rhythm   Pulm: no increased work of breathing   Abdomen: soft, nontender, nondistended, no rebound or guarding; old surgical sites well healed    Extremities: warm and well perfused      LABS:                        14.2   8.40  )-----------( 240      ( 01 Dec 2020 09:27 )             42.9     12-01    138  |  103  |  16  ----------------------------<  96  4.0   |  24  |  0.70    Ca    8.9      01 Dec 2020 09:27  Mg     2.0     12-01    TPro  7.2  /  Alb  4.2  /  TBili  0.6  /  DBili  x   /  AST  178<H>  /  ALT  119<H>  /  AlkPhos  101  12-01      LIVER FUNCTIONS - ( 01 Dec 2020 09:27 )  Alb: 4.2 g/dL / Pro: 7.2 g/dL / ALK PHOS: 101 U/L / ALT: 119 U/L / AST: 178 U/L / GGT: x                 RADIOLOGY     CT Abdomen and Pelvis w/ Oral Cont and w/ IV Cont (12.01.20 @ 12:01)  BOWEL: Small amount hiatal hernia. Status post sleeve gastrectomy. No bowel obstruction or bowel wall thickening.    IMPRESSION:  No acute pathology.

## 2020-12-03 ENCOUNTER — APPOINTMENT (OUTPATIENT)
Dept: SURGERY | Facility: CLINIC | Age: 65
End: 2020-12-03
Payer: COMMERCIAL

## 2020-12-03 DIAGNOSIS — K80.20 CALCULUS OF GALLBLADDER W/OUT CHOLECYSTITIS W/OUT OBSTRUCTION: ICD-10-CM

## 2020-12-03 PROCEDURE — 99204 OFFICE O/P NEW MOD 45 MIN: CPT | Mod: 95

## 2020-12-03 RX ORDER — DICLOFENAC SODIUM 10 MG/G
1 GEL TOPICAL
Qty: 1 | Refills: 0 | Status: DISCONTINUED | COMMUNITY
Start: 2020-05-29 | End: 2020-12-03

## 2020-12-03 NOTE — CONSULT LETTER
[Dear  ___] : Dear  [unfilled], [Consult Letter:] : I had the pleasure of evaluating your patient, [unfilled]. [Please see my note below.] : Please see my note below. [Sincerely,] : Sincerely, [FreeTextEntry3] : Ezequiel Flores MD, FACS\par [facility]\par [facility]\par [address]\par [place]  [address]\par Tel: [phone]\par Email: [email or web address] \par

## 2020-12-03 NOTE — PHYSICAL EXAM
[Normal Breath Sounds] : Normal breath sounds [Normal Heart Sounds] : normal heart sounds [No Rash or Lesion] : No rash or lesion [Calm] : calm [de-identified] : Well-developed well-nourished no acute distress [de-identified] : Sclera clear [de-identified] : Supple without adenopathy [de-identified] : Obese soft and nontender.  There are no masses and there is no Castellanos's sign when the patient palpates her right upper quadrant [de-identified] : No clubbing cyanosis or edema [de-identified] : Cranial nerves II through XII grossly intact

## 2020-12-03 NOTE — ASSESSMENT
[FreeTextEntry1] : Although this patient's symptoms are somewhat atypical I feel she is suffering from biliary colic.  She understands that cholecystectomy may not relieve all her symptoms.  I have offered this patient a laparoscopic cholecystectomy.  The procedure as well as risks(including, but not limited to bleeding, infection, injury to hollow viscus, injury to bile ducts), benefits (pain relief), and alternatives were explained to the patient.\par \par We will be scheduling this procedure in the upcoming weeks.  Please provide medical clearance for general anesthesia

## 2020-12-03 NOTE — REVIEW OF SYSTEMS
[As Noted in HPI] : as noted in HPI [Abdominal Pain] : abdominal pain [Vomiting] : vomiting [Negative] : Heme/Lymph

## 2020-12-03 NOTE — HISTORY OF PRESENT ILLNESS
[de-identified] : I saw this 65-year-old woman in the emergency room at [place] 2 days ago when she complained of severe abdominal pain.  Her pain was substernal associated with nausea and emesis.  The pain sometimes radiates to the right back.  She has had similar episodes over the last several months.  She denies fatty food intolerance.  These painful episodes characteristically last several hours then subside spontaneously

## 2020-12-03 NOTE — REASON FOR VISIT
[Home] : at home, [unfilled] , at the time of the visit. [Medical Office: ([facility])___] : at the medical office located in  [Follow-Up] : a follow-up visit [FreeTextEntry1] : Sedrick singh

## 2021-03-24 ENCOUNTER — OUTPATIENT (OUTPATIENT)
Dept: OUTPATIENT SERVICES | Facility: HOSPITAL | Age: 66
LOS: 1 days | End: 2021-03-24
Payer: COMMERCIAL

## 2021-03-24 DIAGNOSIS — Z20.828 CONTACT WITH AND (SUSPECTED) EXPOSURE TO OTHER VIRAL COMMUNICABLE DISEASES: ICD-10-CM

## 2021-03-24 LAB — SARS-COV-2 RNA SPEC QL NAA+PROBE: SIGNIFICANT CHANGE UP

## 2021-03-24 PROCEDURE — U0005: CPT

## 2021-03-24 PROCEDURE — U0003: CPT

## 2021-03-24 PROCEDURE — C9803: CPT

## 2021-03-25 DIAGNOSIS — Z20.828 CONTACT WITH AND (SUSPECTED) EXPOSURE TO OTHER VIRAL COMMUNICABLE DISEASES: ICD-10-CM

## 2022-08-31 ENCOUNTER — RESULT REVIEW (OUTPATIENT)
Age: 67
End: 2022-08-31

## 2023-08-14 NOTE — CONSULT NOTE ADULT - ASSESSMENT
Please review UA results,   Per standing orders, treat with Macrobid 5 days.   Urine was sent for culture, in process    Just wanted to verify with clinician prior to sending in Rx due to blood in urine.     Other recommendations?    See phone message below.     msg to clinician   Assessment:  65F PMH HTN, HLD, GERD, h/o gastric band s/p removal, s/p gastric sleeve ~1.5 yrs ago presenting with chronic abdominal pain. Bariatric surgery consulted for h/o gastric sleeve. Patient with reassuring abdominal exam, labs largely unremarkable and CTAP without evidence of acute abdominal pathology.     Plan:   - No acute surgical intervention warranted      Assessment:  65F PMH HTN, HLD, GERD, h/o gastric band s/p removal, s/p gastric sleeve ~1.5 yrs ago presenting with chronic abdominal pain. Bariatric surgery consulted for h/o gastric sleeve. Patient with reassuring abdominal exam, labs largely unremarkable and CTAP without evidence of acute abdominal pathology.     Plan:   - No acute surgical intervention warranted     incomplete note     Assessment:  65F PMH HTN, HLD, GERD, h/o gastric band s/p removal, s/p gastric sleeve ~1.5 yrs ago presenting with chronic abdominal pain. Bariatric surgery consulted for h/o gastric sleeve. Patient with reassuring abdominal exam, labs largely unremarkable and CTAP without evidence of acute abdominal pathology.     Plan:   - No acute surgical intervention warranted   - PO challenge in ED  - Outpatient follow up for cholelithiasis, patient should call the office to make an appointment with Dr. Aguilar  - Disposition per ED    Please contact [facility] (p. [phone]) with any questions.     Madonna Edgar, PGY2  Surgery, [facility]   Pager [phone]  [facility]

## 2024-09-29 ENCOUNTER — NON-APPOINTMENT (OUTPATIENT)
Age: 69
End: 2024-09-29

## 2024-09-29 ENCOUNTER — EMERGENCY (EMERGENCY)
Facility: HOSPITAL | Age: 69
LOS: 1 days | Discharge: HOME | End: 2024-09-29
Payer: MEDICARE

## 2024-09-29 VITALS
WEIGHT: 171.08 LBS | HEART RATE: 89 BPM | RESPIRATION RATE: 20 BRPM | DIASTOLIC BLOOD PRESSURE: 86 MMHG | HEIGHT: 65 IN | SYSTOLIC BLOOD PRESSURE: 119 MMHG | TEMPERATURE: 98 F | OXYGEN SATURATION: 95 %

## 2024-09-29 VITALS
RESPIRATION RATE: 16 BRPM | HEART RATE: 77 BPM | TEMPERATURE: 98 F | OXYGEN SATURATION: 96 % | DIASTOLIC BLOOD PRESSURE: 69 MMHG | SYSTOLIC BLOOD PRESSURE: 122 MMHG

## 2024-09-29 PROCEDURE — 96372 THER/PROPH/DIAG INJ SC/IM: CPT | Mod: XU

## 2024-09-29 PROCEDURE — 96374 THER/PROPH/DIAG INJ IV PUSH: CPT

## 2024-09-29 PROCEDURE — 99285 EMERGENCY DEPT VISIT HI MDM: CPT | Mod: 25

## 2024-09-29 PROCEDURE — 99284 EMERGENCY DEPT VISIT MOD MDM: CPT | Mod: GC

## 2024-09-29 RX ORDER — EPINEPHRINE 0.3 MG/.3ML
0.3 INJECTION INTRAMUSCULAR; SUBCUTANEOUS ONCE
Refills: 0 | Status: COMPLETED | OUTPATIENT
Start: 2024-09-29 | End: 2024-09-29

## 2024-09-29 RX ORDER — EPINEPHRINE 0.3 MG/.3ML
0.1 INJECTION INTRAMUSCULAR; SUBCUTANEOUS
Qty: 2 | Refills: 0
Start: 2024-09-29

## 2024-09-29 RX ORDER — PREDNISONE 10 MG
50 TABLET, DOSE PACK ORAL ONCE
Refills: 0 | Status: COMPLETED | OUTPATIENT
Start: 2024-09-29 | End: 2024-09-29

## 2024-09-29 RX ORDER — DIPHENHYDRAMINE HCL 50 MG
50 CAPSULE ORAL ONCE
Refills: 0 | Status: COMPLETED | OUTPATIENT
Start: 2024-09-29 | End: 2024-09-29

## 2024-09-29 RX ORDER — FAMOTIDINE 10 MG/ML
20 INJECTION INTRAVENOUS ONCE
Refills: 0 | Status: COMPLETED | OUTPATIENT
Start: 2024-09-29 | End: 2024-09-29

## 2024-09-29 RX ADMIN — Medication 50 MILLIGRAM(S): at 19:22

## 2024-09-29 RX ADMIN — FAMOTIDINE 20 MILLIGRAM(S): 10 INJECTION INTRAVENOUS at 18:39

## 2024-09-29 RX ADMIN — EPINEPHRINE 0.3 MILLIGRAM(S): 0.3 INJECTION INTRAMUSCULAR; SUBCUTANEOUS at 18:28

## 2024-09-29 RX ADMIN — Medication 50 MILLIGRAM(S): at 18:40

## 2024-09-29 NOTE — ED ADULT NURSE NOTE - HOW OFTEN DO YOU HAVE A DRINK CONTAINING ALCOHOL?
Never Opioid Counseling: I discussed with the patient the potential side effects of opioids including but not limited to addiction, altered mental status, and depression. I stressed avoiding alcohol, benzodiazepines, muscle relaxants and sleep aids unless specifically okayed by a physician. The patient verbalized understanding of the proper use and possible adverse effects of opioids. All of the patient's questions and concerns were addressed. They were instructed to flush the remaining pills down the toilet if they did not need them for pain.

## 2024-09-29 NOTE — ED ADULT TRIAGE NOTE - CHIEF COMPLAINT QUOTE
had Ibuprofen for back pain, now reports difficulty breathing, chest pressure  having rash all over the body, face flush.

## 2024-09-29 NOTE — ED PROVIDER NOTE - NSICDXPASTMEDICALHX_GEN_ALL_CORE_FT
PAST MEDICAL HISTORY:  Hyperlipidemia     Morbid Obesity     Obstructive Sleep Apnea     
Consistent Carbohydrate Diabetic Diets/Other Diet Instructions/Easy to Chew Diet

## 2024-09-29 NOTE — ED PROVIDER NOTE - CLINICAL SUMMARY MEDICAL DECISION MAKING FREE TEXT BOX
70y/o female with PMH htn, presents to ED w/ anaphylaxis. 72y/o female with PMH htn, presents to ED w/ anaphylaxis. patient took ibu at 330 started to develop symptoms at 430 of rash, troat itchy, diff speaking, trouble breathing. patient took no meds prior to arrival. no history of allergic rxn to anaphylaxis, no other exposures. patient is well appearing, hoarse voice, lungs CTA, hives diffuse w/ erythematous face. concern for anaphylaxis. epi given at 630pm. ordered benadryl, steroids, famotidine. counciled patient on anaphylaxis, epi pen usage, sent meds to pharmacy. will require monitoring for 4hrs and re-eval

## 2024-09-29 NOTE — ED PROVIDER NOTE - ATTENDING CONTRIBUTION TO CARE
Emergency Medicine Attending MD Barriga:  patient seen and evaluated with the resident.  I was present for key portions of the History & Physical, and I agree with the Impression & Plan.    Patient is a 68-year-old female complaining of hives, facial redness, throat tightness.  Onset of symptoms approximately 430, 1 hour after taking ibuprofen for lower back pain.  Patient attempted to go to urgent care but it was closed, then attempted to go to [facility] MD but she could not be seen expeditiously because she did not have an appointment, so she came to the ED.    Vital signs: Within normal limits  General: Adult female, + facial erythema, voice sounds scratchy  Oropharynx: No uvular edema, no lip swelling, no tongue edema  Neck supple  Resp:  No distress, no wheezing, and  CV: RRR, no RMG  Abd: nondistended  Ext: no deformities  Neuro:  A&Ox4 appears non focal  Skin: Urticarial rash, face, trunk, arms, legs  Psych: appropriate    Medical Decision Making / Differential Diagnosis:  HPI consistent with anaphylaxis.  Will administer epinephrine, Pepcid, steroids, diphenhydramine.  Epinephrine administered at 6:30 PM.  Will observe until 10:30 PM. Emergency Medicine Attending MD Barriga:  patient seen and evaluated with the resident.  I was present for key portions of the History & Physical, and I agree with the Impression & Plan.    Patient is a 68-year-old female complaining of hives, facial redness, throat tightness.  Onset of symptoms approximately 430, 1 hour after taking ibuprofen for lower back pain.  Patient attempted to go to urgent care but it was closed, then attempted to go to [facility] MD but she could not be seen expeditiously because she did not have an appointment, so she came to the ED.    Vital signs: Within normal limits  General: Adult female, + facial erythema, voice sounds scratchy  Oropharynx: No uvular edema, no lip swelling, no tongue edema  Neck supple  Resp:  No distress, no wheezing, and  CV: RRR, no RMG  Abd: nondistended  Ext: no deformities  Neuro:  A&Ox4 appears non focal  Skin: Urticarial rash, face, trunk, arms, legs  Psych: appropriate    Medical Decision Making / Differential Diagnosis:  HPI consistent with anaphylaxis.  Will administer epinephrine, Pepcid, steroids, diphenhydramine.  Epinephrine administered at 6:30 PM.  Will observe until 10:30 PM.    ECG directly visualized by me and shows normal sinus rhythm, rate 82, , QRS 98, QTc 455, no ST elevations or depressions

## 2024-09-29 NOTE — ED PROVIDER NOTE - PATIENT PORTAL LINK FT
You can access the FollowMyHealth Patient Portal offered by [facility] by registering at the following website: [email or web address]. By joining [employer]’s FollowMyHealth portal, you will also be able to view your health information using other applications (apps) compatible with our system.

## 2024-09-29 NOTE — ED ADULT TRIAGE NOTE - NS ED TRIAGE AVPU SCALE
Alert-The patient is alert, awake and responds to voice. The patient is oriented to time, place, and person. The triage nurse is able to obtain subjective information. george

## 2024-09-29 NOTE — ED ADULT NURSE NOTE - CCCP TRG CHIEF CMPLNT
Medication Name:fluticasone-umeclidin-vilanterol (Trelegy Ellipta)     Dose:100-62.5-25 MCG/INH inhaler    [facility]erred [facility]acy: [facility] Offered: YES    Date of Next Visit: 8.11.21    Patient informed refills can take 48-72 hours: YES  
Next appt 8/11/21  Last appt 1/25/21    Refill request for  Trelegy 100-62.5-25 mcg: Inhale 1 puff into the lungs daily    Refilled per standing med protocol.      
allergic reaction

## 2024-09-29 NOTE — ED ADULT NURSE NOTE - OBJECTIVE STATEMENT
pt is a 69yo female PMH HTN presenting to the ED complaining of allergic reaction. pt reports taking ibuprofen around 0330, began experiencing facial redness/"fullness", "burning" sensation to the chest, chest  pressure, itchy palms/soles of feet, and scratchy throat around 0430, pt denies any history of allergies, has never had an allergic reaction to ibuprofen in the past. pt A&Ox3 gross neuro intact, no difficulty speaking in complete sentences, pulses x 4, vargas x4, abdomen soft nontender nondistended, skin intact. pt denies ha, n/v/d, abdominal pain, f/c, urinary symptoms, hematuria

## 2024-09-29 NOTE — ED PROVIDER NOTE - PROGRESS NOTE DETAILS
MD Barriga: progress note  Significant clinical improvement.  Patient no longer has facial redness, scratchy feeling in her throat is gone, hives are gone.  Plan: DC home, Rx EpiPen, prednisone, OTC diphenhydramine.

## 2024-09-29 NOTE — ED PROVIDER NOTE - NSFOLLOWUPINSTRUCTIONS_ED_ALL_ED_FT
-please take over the counter Pepcid and allergy medicine for the next week.   -you were sent epi pens to your pharmacy  - you were sent steroids to your pharmacy.    Anaphylaxis     WHAT YOU NEED TO KNOW:    Anaphylaxis is a life-threatening allergic reaction that must be treated immediately. Your at risk for anaphylaxis increases if he or she has asthma that is severe or not controlled. Medical conditions such as heart disease can also increase your risk. It is important to be prepared for anaphylaxis. Symptoms can be worse each time he or she is exposed to the trigger.     DISCHARGE INSTRUCTIONS:    Steps to take for signs or symptoms of anaphylaxis:     Immediately give 1 shot of epinephrineonly into the outer thigh muscle. Even if your allergic reaction seems mild, it can quickly become anaphylaxis. This may happen even if you had a mild reaction to the allergen in the past. Each exposure can cause a different reaction. Watch for signs and symptoms of anaphylaxis every time your exposed to a trigger. Be ready to give a shot of epinephrine. It is okay to inject epinephrine through clothing. Just be careful to avoid seams, zippers, or other parts that can prevent the needle from entering the skin.     Leave the shot in place as directed. Your healthcare provider may recommend you leave it in place for up to 10 seconds before you remove it. This helps make sure all of the epinephrine is delivered.     Call 911 and go to the emergency department, even if the shot improved symptoms. Tell your adolescent never to drive himself or herself. Bring the used epinephrine shot to the emergency department.     Call 911 for any of the following:     You have a skin rash, hives, swelling, or itching.     You have trouble breathing, shortness of breath, wheezing, or coughing.    You have throat tightens or lips or tongue swell.    You have trouble swallowing or speaking.    You are dizzy, lightheaded, confused, or feels like he or she is going to faint.    You have nausea, diarrhea, or abdominal cramps, or he or she is vomiting.    Return to the emergency department if:     Signs or symptoms of anaphylaxis return.       Medicines:     Epinephrine is used to treat severe allergic reactions such as anaphylaxis. It is given as a shot into the outer thigh muscle.    Medicines such as antihistamines, steroids, and bronchodilators decrease inflammation, open airways, and make breathing easier. you may take over the counter Allegra/ allergy medicine or pepcid for next few days     Allergy testing may find allergies that can trigger anaphylaxis. Write down your questions so you remember to ask them during your visits.     Safety precautions:     Keep 2 shots of epinephrine with you at all times. You may need a second shot, because epinephrine only works for about 20 minutes and symptoms may return. Your healthcare provider can show you and family members how to give the shot. Check the expiration date every month and replace it before it expires.    Create an action plan. Your healthcare provider can help you create a written plan that explains the allergy and an emergency plan to treat a reaction. The plan explains when to give a second epinephrine shot if symptoms return or do not improve after the first. Give copies of the action plan and emergency instructions to family members, work and school staff, and  providers. Show them how to give a shot of epinephrine.    Be careful when you exercise. If you have had exercise-induced anaphylaxis, do not exercise right after you eat. Stop exercising right away if you start to develop any signs or symptoms of anaphylaxis. You may first feel tired, warm, or have itchy skin. Hives, swelling, and severe breathing problems may develop if you continue to exercise.    Carry medical alert identification. Wear medical alert jewelry or carry a card that explains the allergy. Ask your healthcare provider where to get these items.     Identify and avoid known triggers. Read food labels for ingredients. Look for triggers in your environment.    Ask about treatments to prevent anaphylaxis. You may need allergy shots or other medicines to treat allergies.

## 2024-09-29 NOTE — ED ADULT TRIAGE NOTE - GLASGOW COMA SCALE: BEST MOTOR RESPONSE, MLM
Any new rash needs in-person eval.  Cont Benadryl and cream.  She may need Prednisone, but a visit is required to Rx that .  She can also go to an urgent care closer to her home!  Thanks   (M6) obeys commands

## 2024-09-29 NOTE — ED ADULT NURSE NOTE - NSFALLUNIVINTERV_ED_ALL_ED
Bed/Stretcher in lowest position, wheels locked, appropriate side rails in place/Call bell, personal items and telephone in reach/Instruct patient to call for assistance before getting out of bed/chair/stretcher/Non-slip footwear applied when patient is off stretcher/[place] to call system/Physically safe environment - no spills, clutter or unnecessary equipment/Purposeful proactive rounding/Room/bathroom lighting operational, light cord in reach

## 2024-09-30 RX ORDER — PREDNISONE 10 MG
1 TABLET, DOSE PACK ORAL
Qty: 4 | Refills: 0
Start: 2024-09-30 | End: 2024-10-03

## 2025-01-21 NOTE — ED ADULT NURSE NOTE - CHIEF COMPLAINT QUOTE
Continue Enbrel 50 mg subcutaneously once a week, hold Enbrel with any signs/concerns for infection and seek treatment from primary care physician for infection.    Stop methotrexate.    Continue folic acid 1 mg a day.    High risk medication use: Patient is advised to obtain labs.    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  We had decided to stop methotrexate and     try sulfasalazine.  1 tablet once a day for 2 weeks, then increase it to 1 tablet twice a day.  Obtain labs after taking sulfasalazine for 3 weeks.  
had Ibuprofen for back pain, now reports difficulty breathing, chest pressure  having rash all over the body, face flush.